# Patient Record
Sex: MALE | Race: WHITE | NOT HISPANIC OR LATINO | Employment: FULL TIME | URBAN - METROPOLITAN AREA
[De-identification: names, ages, dates, MRNs, and addresses within clinical notes are randomized per-mention and may not be internally consistent; named-entity substitution may affect disease eponyms.]

---

## 2017-01-09 ENCOUNTER — OFFICE VISIT - HEALTHEAST (OUTPATIENT)
Dept: FAMILY MEDICINE | Facility: CLINIC | Age: 22
End: 2017-01-09

## 2017-01-09 ENCOUNTER — COMMUNICATION - HEALTHEAST (OUTPATIENT)
Dept: TELEHEALTH | Facility: CLINIC | Age: 22
End: 2017-01-09

## 2017-01-09 DIAGNOSIS — S02.609S: ICD-10-CM

## 2017-01-09 DIAGNOSIS — L80 VITILIGO: ICD-10-CM

## 2017-01-09 DIAGNOSIS — Z01.810 PREOP CARDIOVASCULAR EXAM: ICD-10-CM

## 2017-01-09 ASSESSMENT — MIFFLIN-ST. JEOR: SCORE: 1938.44

## 2017-01-11 ENCOUNTER — COMMUNICATION - HEALTHEAST (OUTPATIENT)
Dept: FAMILY MEDICINE | Facility: CLINIC | Age: 22
End: 2017-01-11

## 2017-01-17 ENCOUNTER — ANESTHESIA - HEALTHEAST (OUTPATIENT)
Dept: SURGERY | Facility: HOSPITAL | Age: 22
End: 2017-01-17

## 2017-01-17 ENCOUNTER — SURGERY - HEALTHEAST (OUTPATIENT)
Dept: SURGERY | Facility: HOSPITAL | Age: 22
End: 2017-01-17

## 2017-01-17 ASSESSMENT — MIFFLIN-ST. JEOR: SCORE: 1960.55

## 2020-01-03 ENCOUNTER — OFFICE VISIT - HEALTHEAST (OUTPATIENT)
Dept: FAMILY MEDICINE | Facility: CLINIC | Age: 25
End: 2020-01-03

## 2020-01-03 DIAGNOSIS — N50.819 TESTICLE PAIN: ICD-10-CM

## 2020-01-03 LAB
ALBUMIN UR-MCNC: NEGATIVE MG/DL
APPEARANCE UR: CLEAR
BILIRUB UR QL STRIP: NEGATIVE
COLOR UR AUTO: YELLOW
GLUCOSE UR STRIP-MCNC: NEGATIVE MG/DL
HGB UR QL STRIP: NEGATIVE
KETONES UR STRIP-MCNC: NEGATIVE MG/DL
LEUKOCYTE ESTERASE UR QL STRIP: NEGATIVE
NITRATE UR QL: NEGATIVE
PH UR STRIP: 7 [PH] (ref 5–8)
SP GR UR STRIP: 1.01 (ref 1–1.03)
UROBILINOGEN UR STRIP-ACNC: NORMAL

## 2021-05-30 ENCOUNTER — RECORDS - HEALTHEAST (OUTPATIENT)
Dept: ADMINISTRATIVE | Facility: CLINIC | Age: 26
End: 2021-05-30

## 2021-05-30 VITALS — HEIGHT: 72 IN | WEIGHT: 206 LBS | BODY MASS INDEX: 27.9 KG/M2

## 2021-05-30 VITALS — BODY MASS INDEX: 28.44 KG/M2 | WEIGHT: 210 LBS | HEIGHT: 72 IN

## 2021-06-04 VITALS
HEART RATE: 91 BPM | DIASTOLIC BLOOD PRESSURE: 70 MMHG | WEIGHT: 208.3 LBS | SYSTOLIC BLOOD PRESSURE: 110 MMHG | OXYGEN SATURATION: 97 % | BODY MASS INDEX: 28.25 KG/M2

## 2021-06-04 NOTE — PROGRESS NOTES
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  It is episodic, is a dull pain, only during the day, and associated somewhat with movement.  He is currently sexually active and has had no difficulty with intercourse or ejaculation.  His left testicle is been normal.  He is never had a history of hernias.    ROS: Mandible fracture surgery, foot surgery, vitiligo.    SH:    reports that he has quit smoking. He quit smokeless tobacco use about 4 years ago.  His smokeless tobacco use included chew. He reports current alcohol use. He reports that he does not use drugs.      FH: The Patient's family history is not on file.  Negative for anesthesia reactions per previous note    Meds:  Lanre has a current medication list which includes the following prescription(s): ibuprofen.    O:  /70 (Patient Site: Left Arm, Patient Position: Sitting, Cuff Size: Adult Large)   Pulse 91   Wt 208 lb 4.8 oz (94.5 kg)   SpO2 97%   BMI 28.25 kg/m    Alert conversant no acute distress  Skin Pink and dry  Psych orient x3 with good memory insight and judgment  Abdomen soft nontender no masses  - testicles normal size shape consistency with no evidence of swelling or masses.  Penis normal.  No evidence of inguinal hernia bilaterally.    A/P:   1. Testicle pain  The patient has mild testicular pain that is intermittent.  It is only on the right side.  I find no anatomical abnormalities.  Cannot rule out epididymitis.  Unlikely STDs as he says he has been checked in the past year and has not had a different sexual partners.  We will check his urine and if negative will wait and watch.  We will give it 1 or 2 months to see if it improves.  If not would consider ultrasound although again no masses were noted.  - Urinalysis  Macroscopic

## 2021-06-08 NOTE — PROGRESS NOTES
Assessment/Plan:      Visit for Preoperative Exam.    1. Preop cardiovascular exam  HM2(CBC w/o Differential)    Basic Metabolic Panel   2. Fracture of mandible, unspecified, sequela  HM2(CBC w/o Differential)    Basic Metabolic Panel   3. Primary Vitiligo  HM2(CBC w/o Differential)    Basic Metabolic Panel         Patient approved for surgery with general or local anesthesia. Labs will be done as indicated. Proceed with proposed surgery without additional clinical clarifications.   He knows to not eat after midnight and avoid liquids after the early morning on the day of the procedure which he understands is scheduled at 1:30 PM.    Subjective:     Scheduled Procedure: upper and lower Mandbular repair  Surgery Date:  01/17/17  Surgery Location:  [facility]  Surgeon:  Dr. Khalil    Had fracture mandible in September 2014, had initial surgery to stabilize it, then 2nd surgery about a year later with wisdom teeth removal, and removal of plate from the first surgery.  Now surgery to further correct the structure of mandible as well as maxilla. His jaw clicks and tightens up on him. It is not in normal position.    No current outpatient prescriptions on file.     No current facility-administered medications for this visit.        No Known Allergies    Immunization History   Administered Date(s) Administered     DT (pediatric) 01/01/1998, 06/24/2005     DTaP, historic 1993, 1993, 1993, 09/08/1994, 08/27/1998     Hep B, historic 06/24/2005, 08/24/2005, 02/14/2006     HiB, historic 1993, 1993, 1993     IPV 1993, 1993, 06/07/1994, 08/27/1998     MMR 06/07/1994, 06/24/2005     Meningococcal MCV4P 08/21/2013     Td, historic 06/24/2005, 08/09/2011     Tdap 08/09/2011       Patient Active Problem List   Diagnosis     Primary Vitiligo       History reviewed. No pertinent past medical history.    Social History     Social History     Marital status: Single     Spouse name: N/A      Number of children: N/A     Years of education: N/A     Occupational History     Not on file.     Social History Main Topics     Smoking status: Former Smoker     Smokeless tobacco: Former User     Types: Chew     Quit date: 6/1/2015     Alcohol use Yes      Comment: 4-6  beer per week     Drug use: No      Comment: Previous use of marijuana, last was more than 1 year ago.     Sexual activity: Not on file     Other Topics Concern     Not on file     Social History Narrative       Past Surgical History   Procedure Laterality Date     Mandible fracture surgery Right September 2014     [facility] [place], [place], S.[place].     Foot surgery Left 2007     pin 5th metatarsal fracture     Pr dental surgery procedure N/A 6/16/2015     Procedure: EXTRACTION OF #1, 16, 17, 32;  Surgeon: Akira Khalil DDS;  Location: [facility] [place];  Service: Oral Surgery       History of Present Illness  Recent Health  Fever: no  Chills: no  Fatigue: no  Chest Pain: no  Cough: no  Dyspnea: no  Urinary Frequency: no  Nausea: no  Vomiting: no  Diarrhea: no  Abdominal Pain: no  Easy Bruising: no  Lower Extremity Swelling: no  Poor Exercise Tolerance: no    Most recent Health Maintenance Visit:      Pertinent History  Prior Anesthesia: yes  Previous Anesthesia Reaction:  no  Diabetes: no  Cardiovascular Disease: no  Pulmonary Disease: no  Renal Disease: no  GI Disease: no  Sleep Apnea: no  Thromboembolic Problems: no  Clotting Disorder: no  Bleeding Disorder: no  Transfusion Reaction: no  Impaired Immunity: no  Steroid use in the last 6 months: no  Frequent Aspirin use: no    Family history of no anesthesia reaction    Social history of patient does not wear denture or partial plates, there is no transfusion refusal and there are no concerns regarding care after surgery    After surgery, the patient plans to recover at home with family.    Review of Systems  Review of Systems   All other ROS are negative except as above.     "      Objective:         Vitals:    01/09/17 0935   BP: 116/80   Pulse: 66   Resp: 14   SpO2: 97%   Weight: 206 lb (93.4 kg)   Height: 5' 11.75\" (1.822 m)       Physical Exam:  Physical Exam   Head normocephalic.  External ears and TMs are normal.  Eyes show pupils equal round and reactive to light and accommodation.  Nose not remarkable.  Oropharynx shows there is asymmetry of his occlusion compatible with being related to his previous mandible surgery.  Neck supple without adenopathy or thyromegaly.  Lungs clear to auscultation.  Heart regular rate and rhythm without murmur.  Abdomen shows no masses tenderness or hepatosplenomegaly.  Genitalia normal normal testes, no hernia, some vitiligo present at the base of the penis.  Rectal examination unremarkable.  Extremities show no edema.    no focal neurologic deficits.  Alert with clear speech.    "

## 2021-06-08 NOTE — ANESTHESIA POSTPROCEDURE EVALUATION
Patient: Lanre Wagner  THREE PIECE LEFORT ONE OSTEOTOMY AND BILATERAL SAGITTAL SPLIT RAMUS OSTEOTOMY  Anesthesia type: general    Patient location: PACU  Last vitals:   Vitals:    01/17/17 1930   BP: (!) 168/91   Pulse: 93   Resp: 19   Temp:    SpO2: 96%     Post vital signs: stable  Level of consciousness: awake and responds to simple questions  Post-anesthesia pain: pain controlled  Post-anesthesia nausea and vomiting: no  Pulmonary: unassisted, return to baseline  Cardiovascular: stable and blood pressure at baseline  Hydration: adequate  Anesthetic events: no    QCDR Measures:  ASA# 11 - Deidra-op Cardiac Arrest: ASA11B - Patient did NOT experience unanticipated cardiac arrest  ASA# 12 - Deidra-op Mortality Rate: ASA12B - Patient did NOT die  ASA# 13 - PACU Re-Intubation Rate: ASA13B - Patient did NOT require a new airway mgmt  ASA# 10 - Composite Anes Safety: ASA10A - No serious adverse event  ASA# 38 - New Corneal Injury: ASA38A - No new exposure keratitis or corneal abrasion in PACU    Additional Notes:

## 2021-06-08 NOTE — ANESTHESIA PREPROCEDURE EVALUATION
Anesthesia Evaluation      Patient summary reviewed   No history of anesthetic complications     Airway   Mallampati: II  Neck ROM: full   Pulmonary - normal exam   (+) a smoker (Former smoker)                         Cardiovascular - negative ROS and normal exam   Neuro/Psych - negative ROS     Endo/Other       Comments: Hx mandibular fx    GI/Hepatic/Renal - negative ROS      Other findings: Vitiligo      Dental      Comment: Full braces upper and lower                       Anesthesia Plan  Planned anesthetic: general endotracheal  Nasal NATASHA  Glidescope intubation  Propofol 25 mcg/kg/min IV with Sevo.  ASA 1   Induction: intravenous   Anesthetic plan and risks discussed with: patient  Anesthesia plan special considerations: antiemetics,   Post-op plan: routine recovery

## 2021-06-08 NOTE — ANESTHESIA CARE TRANSFER NOTE
Last vitals:   Vitals:    01/17/17 1913   BP: (!) 190/110   Pulse: 97   Resp: 16   Temp: 36.4  C (97.6  F)   SpO2: 94%     Patient's level of consciousness is drowsy  Spontaneous respirations: yes  Maintains airway independently: yes  Dentition unchanged: yes  Oropharynx: oropharynx clear of all foreign objects    QCDR Measures:  ASA# 20 - Surgical Safety Checklist: ASA20A - Safety Checks Done  PQRS# 430 - Adult PONV Prevention: 4558F - Pt received => 2 anti-emetic agents (different classes) preop & intraop  ASA# 8 - Peds PONV Prevention: NA - Not pediatric patient, not GA or 2 or more risk factors NOT present  PQRS# 424 - Deidra-op Temp Management: 4559F - At least one body temp DOCUMENTED => 35.5C or 95.9F within required timeframe  PQRS# 426 - PACU Transfer Protocol: - Transfer of care checklist used  ASA# 14 - Acute Post-op Pain: ASA14B - Patient did NOT experience pain >= 7 out of 10    I completed my SBAR handoff to the receiving nurse per policy and procedure.

## 2021-06-15 PROBLEM — M26.02 MAXILLARY HYPOPLASIA: Status: ACTIVE | Noted: 2017-01-17

## 2021-07-09 ENCOUNTER — COMMUNICATION - HEALTHEAST (OUTPATIENT)
Dept: FAMILY MEDICINE | Facility: CLINIC | Age: 26
End: 2021-07-09

## 2021-07-22 NOTE — LETTER
Letter by Kyle Novoa CNP at      Author: Kyle Novoa CNP Service: -- Author Type: --    Filed:  Encounter Date: 7/9/2021 Status: (Other)         Lanre Wagner  [address] [facility] [address]             July 9, 2021         Dear Mr. Wagner,    Below are the results from your recent visit:    Resulted Orders   Lipid [place] FASTING   Result Value Ref Range    Cholesterol 185 <=199 mg/dL    Triglycerides 102 <=149 mg/dL    HDL Cholesterol 62 >=40 mg/dL    LDL Calculated 103 <=129 mg/dL    Patient Fasting > 8hrs? Yes    Glucose   Result Value Ref Range    Glucose 94 79 - 116 mg/dL    Patient Fasting > 8hrs? Yes     Narrative    Fasting Glucose reference range is 70-99 mg/dL per  American Diabetes Association (ADA) guidelines.       Blood sugar levels look great.  Cholesterol levels also look good!  Like we said, this can be checked every 1-5 years.    Please call with questions or contact us using [employer].    Sincerely,       Kyle Novoa CNP

## 2021-10-03 ENCOUNTER — HEALTH MAINTENANCE LETTER (OUTPATIENT)
Age: 26
End: 2021-10-03

## 2022-09-04 ENCOUNTER — HEALTH MAINTENANCE LETTER (OUTPATIENT)
Age: 27
End: 2022-09-04

## 2023-08-31 ENCOUNTER — OFFICE VISIT (OUTPATIENT)
Dept: FAMILY MEDICINE | Facility: CLINIC | Age: 28
End: 2023-08-31
Payer: COMMERCIAL

## 2023-08-31 VITALS
SYSTOLIC BLOOD PRESSURE: 126 MMHG | HEART RATE: 78 BPM | RESPIRATION RATE: 16 BRPM | WEIGHT: 233 LBS | OXYGEN SATURATION: 96 % | DIASTOLIC BLOOD PRESSURE: 82 MMHG | HEIGHT: 71 IN | BODY MASS INDEX: 32.62 KG/M2 | TEMPERATURE: 98.1 F

## 2023-08-31 DIAGNOSIS — Z00.00 ROUTINE GENERAL MEDICAL EXAMINATION AT A HEALTH CARE FACILITY: Primary | ICD-10-CM

## 2023-08-31 DIAGNOSIS — R06.83 SNORING: ICD-10-CM

## 2023-08-31 DIAGNOSIS — L80 VITILIGO: ICD-10-CM

## 2023-08-31 DIAGNOSIS — I86.1 RIGHT VARICOCELE: ICD-10-CM

## 2023-08-31 PROCEDURE — 99395 PREV VISIT EST AGE 18-39: CPT | Performed by: NURSE PRACTITIONER

## 2023-08-31 PROCEDURE — 99213 OFFICE O/P EST LOW 20 MIN: CPT | Mod: 25 | Performed by: NURSE PRACTITIONER

## 2023-08-31 SDOH — HEALTH STABILITY: PHYSICAL HEALTH: ON AVERAGE, HOW MANY DAYS PER WEEK DO YOU ENGAGE IN MODERATE TO STRENUOUS EXERCISE (LIKE A BRISK WALK)?: 4 DAYS

## 2023-08-31 SDOH — HEALTH STABILITY: PHYSICAL HEALTH: ON AVERAGE, HOW MANY MINUTES DO YOU ENGAGE IN EXERCISE AT THIS LEVEL?: 40 MIN

## 2023-08-31 SDOH — ECONOMIC STABILITY: INCOME INSECURITY: IN THE LAST 12 MONTHS, WAS THERE A TIME WHEN YOU WERE NOT ABLE TO PAY THE MORTGAGE OR RENT ON TIME?: NO

## 2023-08-31 SDOH — ECONOMIC STABILITY: INCOME INSECURITY: HOW HARD IS IT FOR YOU TO PAY FOR THE VERY BASICS LIKE FOOD, HOUSING, MEDICAL CARE, AND HEATING?: NOT VERY HARD

## 2023-08-31 SDOH — ECONOMIC STABILITY: TRANSPORTATION INSECURITY
IN THE PAST 12 MONTHS, HAS LACK OF TRANSPORTATION KEPT YOU FROM MEETINGS, WORK, OR FROM GETTING THINGS NEEDED FOR DAILY LIVING?: NO

## 2023-08-31 SDOH — ECONOMIC STABILITY: FOOD INSECURITY: WITHIN THE PAST 12 MONTHS, THE FOOD YOU BOUGHT JUST DIDN'T LAST AND YOU DIDN'T HAVE MONEY TO GET MORE.: NEVER TRUE

## 2023-08-31 SDOH — ECONOMIC STABILITY: TRANSPORTATION INSECURITY
IN THE PAST 12 MONTHS, HAS THE LACK OF TRANSPORTATION KEPT YOU FROM MEDICAL APPOINTMENTS OR FROM GETTING MEDICATIONS?: NO

## 2023-08-31 SDOH — ECONOMIC STABILITY: FOOD INSECURITY: WITHIN THE PAST 12 MONTHS, YOU WORRIED THAT YOUR FOOD WOULD RUN OUT BEFORE YOU GOT MONEY TO BUY MORE.: NEVER TRUE

## 2023-08-31 ASSESSMENT — ENCOUNTER SYMPTOMS
JOINT SWELLING: 0
HEARTBURN: 0
MYALGIAS: 0
HEADACHES: 0
NERVOUS/ANXIOUS: 0
DIARRHEA: 0
PALPITATIONS: 0
CHILLS: 0
ARTHRALGIAS: 0
ABDOMINAL PAIN: 0
SORE THROAT: 0
HEMATOCHEZIA: 0
WEAKNESS: 0
COUGH: 0
NAUSEA: 0
EYE PAIN: 0
PARESTHESIAS: 0
DYSURIA: 0
SHORTNESS OF BREATH: 0
FREQUENCY: 0
HEMATURIA: 0
CONSTIPATION: 0
FEVER: 0
DIZZINESS: 0

## 2023-08-31 ASSESSMENT — LIFESTYLE VARIABLES
HOW MANY STANDARD DRINKS CONTAINING ALCOHOL DO YOU HAVE ON A TYPICAL DAY: 5 OR 6
HOW OFTEN DO YOU HAVE SIX OR MORE DRINKS ON ONE OCCASION: WEEKLY
AUDIT-C TOTAL SCORE: 8
HOW OFTEN DO YOU HAVE A DRINK CONTAINING ALCOHOL: 2-3 TIMES A WEEK
SKIP TO QUESTIONS 9-10: 0

## 2023-08-31 ASSESSMENT — SOCIAL DETERMINANTS OF HEALTH (SDOH)
DO YOU BELONG TO ANY CLUBS OR ORGANIZATIONS SUCH AS CHURCH GROUPS UNIONS, FRATERNAL OR ATHLETIC GROUPS, OR SCHOOL GROUPS?: YES
ARE YOU MARRIED, WIDOWED, DIVORCED, SEPARATED, NEVER MARRIED, OR LIVING WITH A PARTNER?: LIVING WITH PARTNER
HOW OFTEN DO YOU GET TOGETHER WITH FRIENDS OR RELATIVES?: ONCE A WEEK
IN A TYPICAL WEEK, HOW MANY TIMES DO YOU TALK ON THE PHONE WITH FAMILY, FRIENDS, OR NEIGHBORS?: MORE THAN THREE TIMES A WEEK
HOW OFTEN DO YOU ATTEND CHURCH OR RELIGIOUS SERVICES?: 1 TO 4 TIMES PER YEAR

## 2023-08-31 NOTE — PROGRESS NOTES
SUBJECTIVE:   CC: Lanre is an 28 year old who presents for preventative health visit.       8/31/2023     3:32 PM   Additional Questions   Roomed by Elsa PEÑA CMA       Healthy Habits:     Getting at least 3 servings of Calcium per day:  Yes    Bi-annual eye exam:  Yes    Dental care twice a year:  Yes    Sleep apnea or symptoms of sleep apnea:  Daytime drowsiness and Excessive snoring    Diet:  Regular (no restrictions)    Frequency of exercise:  2-3 days/week    Duration of exercise:  30-45 minutes    Taking medications regularly:  Yes    Medication side effects:  None    Additional concerns today:  Yes  Snoring:  has had increase in snoring and some episode where he stops breathing as reported by his fiance. Feels tired during the day.    Varicocele:  has noticed slightly enlarged veins along the scrotum, at times has slight heaviness along the right side of the scrotum.     Vitiligo:  increased area of white skin on hands, now going up his arms, would like to see another specialist. referral [facility] dermatology.   Today's PHQ-2 Score:       8/31/2023     8:10 AM   PHQ-2 ( 1999 [employer])   Q1: Little interest or pleasure in doing things 0   Q2: Feeling down, depressed or hopeless 0   PHQ-2 Score 0   Q1: Little interest or pleasure in doing things Not at all   Q2: Feeling down, depressed or hopeless Not at all   PHQ-2 Score 0         Have you ever done Advance Care Planning? (For example, a Health Directive, POLST, or a discussion with a medical provider or your loved ones about your wishes): No, advance care planning information given to patient to review.  Patient declined advance care planning discussion at this time.    Social History     Tobacco Use    Smoking status: Former     Types: Dip, chew, snus or snuff    Smokeless tobacco: Former     Types: Chew     Quit date: 6/1/2015    Tobacco comments:     Smokes and chews during baseball.   Substance Use Topics    Alcohol use: Yes     Comment: Alcoholic  Drinks/day: 4-6  beer per week         8/31/2023     8:10 AM   Alcohol Use   Prescreen: >3 drinks/day or >7 drinks/week? Yes   AUDIT SCORE  12         8/31/2023     8:10 AM   AUDIT - Alcohol Use Disorders Identification Test - Reproduced from the World Health Organization Audit 2001 (Second Edition)   1.  How often do you have a drink containing alcohol? 2 to 3 times a week   2.  How many drinks containing alcohol do you have on a typical day when you are drinking? 5 or 6   3.  How often do you have five or more drinks on one occasion? Weekly   4.  How often during the last year have you found that you were not able to stop drinking once you had started? Never   5.  How often during the last year have you failed to do what was normally expected of you because of drinking? Never   6.  How often during the last year have you needed a first drink in the morning to get yourself going after a heavy drinking session? Never   7.  How often during the last year have you had a feeling of guilt or remorse after drinking? Less than monthly   8.  How often during the last year have you been unable to remember what happened the night before because of your drinking? Less than monthly   9.  Have you or someone else been injured because of your drinking? No   10. Has a relative, friend, doctor or other health care worker been concerned about your drinking or suggested you cut down? Yes, but not in the last year   TOTAL SCORE 12     Last PSA: No results found for: PSA    Reviewed orders with patient. Reviewed health maintenance and updated orders accordingly - Yes  BP Readings from Last 3 Encounters:   08/31/23 126/82   07/08/21 114/86   01/03/20 110/70    Wt Readings from Last 3 Encounters:   08/31/23 105.7 kg (233 lb)   07/08/21 95.7 kg (211 lb)   01/03/20 94.5 kg (208 lb 4.8 oz)                  Patient Active Problem List   Diagnosis    Primary Vitiligo    Maxillary hypoplasia     Past Surgical History:   Procedure Laterality  Date    EYE SURGERY  2013    Lasik    FOOT SURGERY Left 01/01/2007    pin 5th metatarsal fracture    MANDIBLE FRACTURE SURGERY Right 09/01/2014    [facility], [facility] DENTAL SURGERY PROCEDURE N/A 06/16/2015    Procedure: EXTRACTION OF #1, 16, 17, 32;  Surgeon: Akira Khalil DDS;  Location: [facility] [place];  Service: Oral Surgery    ZZC OSTEOTOMY, MANDIBLE N/A 01/17/2017    Procedure: THREE PIECE LEFORT ONE OSTEOTOMY AND BILATERAL SAGITTAL SPLIT RAMUS OSTEOTOMY;  Surgeon: Akira Khalil DDS;  Location: [facility] [place];  Service: Oral Surgery       Social History     Tobacco Use    Smoking status: Former     Types: Dip, chew, snus or snuff    Smokeless tobacco: Former     Types: Chew     Quit date: 6/1/2015    Tobacco comments:     Smokes and chews during baseball.   Substance Use Topics    Alcohol use: Yes     Comment: Alcoholic Drinks/day: 4-6  beer per week     Family History   Problem Relation Age of Onset    Anxiety Disorder Mother     No Known Problems Father     No Known Problems Sister     No Known Problems Brother     Substance Abuse Maternal Grandfather          Current Outpatient Medications   Medication Sig Dispense Refill    ibuprofen (ADVIL,MOTRIN) 200 MG tablet [IBUPROFEN (ADVIL,MOTRIN) 200 MG TABLET] Take 400-600 mg by mouth every 4 (four) hours as needed for pain.       loratadine-pseudoephedrine (CLARITIN-D 24 HOUR)  mg per 24 hr tablet [LORATADINE-PSEUDOEPHEDRINE (CLARITIN-D 24 HOUR)  MG PER 24 HR TABLET]        No Known Allergies    Reviewed and updated as needed this visit by clinical staff   Tobacco  Allergies  Meds              Reviewed and updated as needed this visit by Provider                     Review of Systems   Constitutional:  Negative for chills and fever.   HENT:  Positive for congestion. Negative for ear pain, hearing loss and sore throat.    Eyes:  Negative for pain and visual disturbance.   Respiratory:  Negative  "for cough and shortness of breath.    Cardiovascular:  Negative for chest pain, palpitations and peripheral edema.   Gastrointestinal:  Negative for abdominal pain, constipation, diarrhea, heartburn, hematochezia and nausea.   Genitourinary:  Negative for dysuria, frequency, genital sores, hematuria, impotence and penile discharge.   Musculoskeletal:  Negative for arthralgias, joint swelling and myalgias.   Neurological:  Negative for dizziness, weakness, headaches and paresthesias.   Psychiatric/Behavioral:  Negative for mood changes. The patient is not nervous/anxious.        OBJECTIVE:   /82   Pulse 78   Temp 98.1  F (36.7  C) (Oral)   Resp 16   Ht 1.803 m (5' 11\")   Wt 105.7 kg (233 lb)   SpO2 96%   BMI 32.50 kg/m      Physical Exam  GENERAL: healthy, alert and no distress  HENT: ear canals and TM's normal, nose and mouth without ulcers or lesions  NECK: no adenopathy, no asymmetry, masses, or scars and thyroid normal to palpation  RESP: lungs clear to auscultation - no rales, rhonchi or wheezes  CV: regular rate and rhythm, normal S1 S2, no S3 or S4, no murmur, click or rub, no peripheral edema   ABDOMEN: soft, nontender, no hepatosplenomegaly, no masses and bowel sounds normal  :  right scrotum with slightly enlarged veins.  MS: no gross musculoskeletal defects noted, no edema  SKIN  white patches of skin on bilateral hands and lower arms.  NEURO: Normal strength and tone, mentation intact and speech normal  PSYCH: mentation appears normal, affect normal/bright    ASSESSMENT/PLAN:   (Z00.00) Routine general medical examination at a health care facility  (primary encounter diagnosis)  Comment: will return fasting for labs  Plan: CBC with Platelets & Differential,         Comprehensive metabolic panel, Lipid panel         reflex to direct LDL Fasting           (R06.83) Snoring  Comment: recommend sleep study  Plan: Adult Sleep Eval & Management          Referral        Prefers to have " this completed at [place], [phone].     (L80) Primary Vitiligo  Comment: present on bilateral hands for years, increasing.   Plan: Suggested seeing [facility] for further evaluation    (I86.1) Right varicocele  Comment: continue to monitor, follow up with urology if increase in size.  Plan: call for referral to urology if needed.         COUNSELING:   Reviewed preventive health counseling, as reflected in patient instructions       Regular exercise       Healthy diet/nutrition       Alcohol Use         He reports that he has quit smoking. His smoking use included dip, chew, snus or snuff. He quit smokeless tobacco use about 8 years ago.  His smokeless tobacco use included chew.            Susan Haase, APRN CNP  [facility]

## 2024-11-24 ENCOUNTER — HEALTH MAINTENANCE LETTER (OUTPATIENT)
Age: 29
End: 2024-11-24